# Patient Record
Sex: FEMALE | Race: WHITE | NOT HISPANIC OR LATINO | Employment: OTHER | URBAN - METROPOLITAN AREA
[De-identification: names, ages, dates, MRNs, and addresses within clinical notes are randomized per-mention and may not be internally consistent; named-entity substitution may affect disease eponyms.]

---

## 2018-08-13 ENCOUNTER — OFFICE VISIT CONVERTED (OUTPATIENT)
Dept: SURGERY | Facility: CLINIC | Age: 68
End: 2018-08-13
Attending: SURGERY

## 2020-04-20 ENCOUNTER — TELEPHONE CONVERTED (OUTPATIENT)
Dept: GASTROENTEROLOGY | Facility: CLINIC | Age: 70
End: 2020-04-20
Attending: INTERNAL MEDICINE

## 2021-05-12 NOTE — PROGRESS NOTES
Quick Note      Patient Name: Tosha Jenkins   Patient ID: [id number]   Sex: Female   YOB: 1950    Primary Care Provider: Otoniel Ferreira DO   Referring Provider: Hector Hahn MD    Visit Date: April 20, 2020    Provider: Eva Nagy MD   Location: [facility]   Location Address: [address] [place] [address], [address]  [place], [place]  [id number]   Location Phone: [phone]          History Of Present Illness  TELEHEALTH TELEPHONE VISIT  Chief Complaint: diarrhea   Tosha Jenkins is a 69 year old /White female who is presenting for evaluation via telehealth telephone visit. Verbal consent obtained before beginning visit.   Provider spent 19 minutes with the patient during telehealth visit.   The following staff were present during this visit: Eva Nagy MD; Radha Avilez MA   Past Medical History/Overview of Patient Symptoms     Ms. Jenkins is a 69 year old female with h/o anemia and colon polyps who presents for evaluation of diarrhea. EGD/colonoscopy in 2018 with Dr. Manzano for anemia.  Pt reports most recently, her anemia has resolved.  Had total knee replacement 10/2019 and was started on stool softeners/laxatives.  She was transferred to rehab.  She reports at that time had difficulty controlling her bowels.  She reports she did stool studies that were negative.  Symptoms have improved since February.  She now will have regular bowel movements.  Occ. loose stools.  She has been taking a probiotic a few months now with improvements in her bowel movements.  She will have 1 bowel movement daily.  No melena, hematochezia.  No abdominal pain.  Weight stable, increasing.  She reports abdominal cramping and significant diarrhea after eating large meal like at [facility].    EGD (8/22/2018): Normal esophagus, small hiatal hernia, gastric erythema, normal duodenum.  Antrum biopsy with slight chronic gastritis.      Colonoscopy (8/22/2018): Moderate pandiverticulosis,  "normal terminal ileum, medium nonbleeding internal hemorrhoids.  Report of TI biopsies performed but no pathology available.       Vitals  Date Time BP Position Site L\R Cuff Size HR RR TEMP (F) WT  HT  BMI kg/m2 BSA m2 O2 Sat HC       04/20/2020 09:22 AM         220lbs 0oz 5'  5\" 36.61 2.14               Assessment  · Diarrhea     787.91/R19.7  · Change in bowel habits     787.99/R19.4  · Irritable bowel syndrome     564.1/K58.9    Problems Reconciled  Plan  · Orders  o Physician Telephone Evaluation, 11-20 minutes (92533) - - 04/20/2020  · Medications  o dicyclomine 10 mg oral capsule   SIG: take 1 capsule (10 mg) by oral route 2 times per day as needed for abdominal pain   DISP: (60) capsules with 0 refills  Prescribed on 04/20/2020     · Instructions  o Plan Of Care:   o Discussed continuing probiotic to help regulate bowel movements since seems to be doing well with current probiotic. If symptoms returned, would perform Celiac serologies miguel angel. with previous h/o anemia.  o Discussed trial of Bentyl prn for IBS symptoms associated with eating out. Discussed potential side effects of med. Another option would be taking loperamide before a meal.  · Disposition  o Follow up in 6 months.            Electronically Signed by: Eva Nagy MD -Author on April 20, 2020 09:49:56 AM  "

## 2021-05-15 VITALS — BODY MASS INDEX: 36.65 KG/M2 | WEIGHT: 220 LBS | HEIGHT: 65 IN

## 2021-05-16 VITALS — WEIGHT: 218.25 LBS | BODY MASS INDEX: 36.36 KG/M2 | HEIGHT: 65 IN | RESPIRATION RATE: 16 BRPM

## 2024-04-17 ENCOUNTER — TELEPHONE (OUTPATIENT)
Dept: SURGERY | Facility: CLINIC | Age: 74
End: 2024-04-17
Payer: MEDICARE

## 2024-04-17 NOTE — TELEPHONE ENCOUNTER
PT CX'D HER APPT WITH APRIL FOR COLON CONSULT ON 4/24/24, CALLED PT TO RS, NO ANSWER, LMOM, IF PT CALLS BACK SHE CAN BE RESCHEDULED WITH DAMIAN SANTIAGO NEXT AVAILABLE FOR COLON CONSULT.

## 2025-03-17 NOTE — PROGRESS NOTES
Chief Complaint: Blood in Urine (No burning and pain)    Subjective         History of Present Illness  Tosha Jenkins is a 74 y.o. female presents to [facility] to be seen for gross hematuria.    The patient reports that she first started to notice pink blood-tinged urine back in July 2023.  She reports that at that time she had hematuria for a few days and then it resolved and she really did not see blood again in her urine until December 2024.  She reports that since December 2024 she has had approximately 3 episodes of asymptomatic gross hematuria.  She reports that the last time that she saw blood in her urine was in late February or early March.    She does report that she does urinate frequently and also has issues with urgency and urge incontinence, but these are not overly bothersome to her at this time and she attributes her symptoms to her drinking a lot of water.  She does wear a incontinence pad daily.  She has never been treated for overactive bladder and is not interested in treatment for this at this time.    Urinary symptoms/history:   Frequency-admits, but is drinking a lot of water     Urgency-sometimes, depending on how much she has drank and how full her bladder is    Incontinence-admits, sometimes due to urgency, wears a pad, denies stress incontinence     Nocturia-admits, 2 X per night on average     GH-admits    History of stones-denies      surgeries-denies     Family history of  malignancy-denies     Cardiopulmonary-HTN    Anticoagulants-none     Smoker-denies     Urine microscopy  2/5/2025 0-2 RBCs per hpf      Objective     Past Medical History:   Diagnosis Date    Hiatal hernia     Hypertension     Polio osteopathy of lower leg, unspecified laterality        Past Surgical History:   Procedure Laterality Date    KNEE ARTHROPLASTY Left     TUBAL ABDOMINAL LIGATION           Current Outpatient Medications:     amLODIPine (NORVASC) 2.5 MG tablet, Take 1 tablet  "by mouth Daily., Disp: , Rfl:     D3-1000 25 MCG (1000 UT) capsule, Take 1 capsule by mouth Daily., Disp: , Rfl:     ferrous sulfate 325 (65 FE) MG tablet, Take 1 tablet by mouth Daily With Breakfast., Disp: , Rfl:     losartan (COZAAR) 100 MG tablet, Take 1 tablet by mouth Daily., Disp: , Rfl:     No Known Allergies     History reviewed. No pertinent family history.    Social History     Socioeconomic History    Marital status:        Vital Signs:   Resp 18   Ht 165.1 cm (65\")   Wt 103 kg (226 lb)   BMI 37.61 kg/m²      Physical Exam  Vitals and nursing note reviewed.   Constitutional:       General: She is not in acute distress.     Appearance: Normal appearance. She is not toxic-appearing.   Pulmonary:      Effort: Pulmonary effort is normal. No respiratory distress.   Neurological:      General: No focal deficit present.      Mental Status: She is alert and oriented to person, place, and time.          Result Review :   The following data was reviewed by: MICHELLE Venegas on 03/19/2025:  Results for orders placed or performed in visit on 03/19/25   Bladder Scan    Collection Time: 03/19/25 10:31 AM   Result Value Ref Range    Volume: 0 ML       Bladder Scan interpretation 03/19/2025    Estimation of residual urine via imgfaveI 3000 Verathon Bladder Scan  MA/nurse performing: MADELEINE Wagner  Residual Urine: 0 ml  Indication: Gross hematuria    Urinary frequency    Urinary urgency    Urge incontinence of urine   Position: Supine  Examination: Incremental scanning of the suprapubic area using 2.0 MHz transducer using copious amounts of acoustic gel.   Findings: An anechoic area was demonstrated which represented the bladder, with measurement of residual urine as noted. I inspected this myself. In that the residual urine was stable or insignificant, refer to plan for treatment and plan necessary at this time.           Procedures        Assessment and Plan    Diagnoses and all orders for this visit:    1. Gross " hematuria (Primary)  -     Bladder Scan  -     CT Abdomen Pelvis With & Without Contrast; Future  -     Cystoscopy; Future    2. Urinary frequency    3. Urinary urgency    4. Urge incontinence of urine    Will proceed with gross hematuria workup per AUA guidelines including upper and lower urinary tract evaluations.  Will obtain CT scan with/without/and delayed imaging for upper tract evaluation; patient denies contrast allergy.  Patient to schedule cystoscopy for lower urinary tract evaluation after CT scan.     The patient is not overly bothered by her urinary symptoms of frequency, urgency and urge incontinence at this time.  She does not desire treatment with any medication for the symptoms at this time.  Encouraged behavioral modifications including fluid management, limiting fluids before bedtime, elevating legs before bedtime, avoiding bladder irritants including caffeine, etc.    Will schedule patient for cystoscopy.  I will plan to see the patient back in the office as needed/as recommended by Dr. Benz following the patient's cystoscopy.      Follow Up   Return for Cystoscopy with Dr. Benz on 5/7/2025 at 2pm .  Patient was given instructions and counseling regarding her condition or for health maintenance advice. Please see specific information pulled into the AVS if appropriate.         This document has been electronically signed by MICHELLE Venegas  March 19, 2025 11:18 EDT

## 2025-03-19 ENCOUNTER — OFFICE VISIT (OUTPATIENT)
Dept: UROLOGY | Age: 75
End: 2025-03-19
Payer: MEDICARE

## 2025-03-19 VITALS — RESPIRATION RATE: 18 BRPM | HEIGHT: 65 IN | WEIGHT: 226 LBS | BODY MASS INDEX: 37.65 KG/M2

## 2025-03-19 DIAGNOSIS — N39.41 URGE INCONTINENCE OF URINE: ICD-10-CM

## 2025-03-19 DIAGNOSIS — R39.15 URINARY URGENCY: ICD-10-CM

## 2025-03-19 DIAGNOSIS — R35.0 URINARY FREQUENCY: ICD-10-CM

## 2025-03-19 DIAGNOSIS — R31.0 GROSS HEMATURIA: Primary | ICD-10-CM

## 2025-03-19 LAB — SPECIMEN VOL SMN: NORMAL ML

## 2025-03-19 RX ORDER — AMLODIPINE BESYLATE 2.5 MG/1
1 TABLET ORAL DAILY
COMMUNITY

## 2025-03-19 RX ORDER — UBIDECARENONE 100 MG
1000 CAPSULE ORAL DAILY
COMMUNITY

## 2025-03-19 RX ORDER — FERROUS SULFATE 325(65) MG
325 TABLET ORAL
COMMUNITY

## 2025-03-19 RX ORDER — LOSARTAN POTASSIUM 100 MG/1
100 TABLET ORAL DAILY
COMMUNITY

## 2025-04-22 ENCOUNTER — TELEPHONE (OUTPATIENT)
Dept: UROLOGY | Age: 75
End: 2025-04-22
Payer: MEDICARE

## 2025-04-22 NOTE — TELEPHONE ENCOUNTER
Caller: QIALDO ÁLVAREZIN    Best call back number: [phone] (home)       What is the best time to reach you: ANYTIME    Who are you requesting to speak with (clinical staff, provider,  specific staff member): CLINICAL STAFF      What was the call regarding: PT ASKING IF FASTING IS REQUIRED PRIOR TO CT SCAN. PLEASE CALL PT BACK TO ADVISE.

## 2025-04-23 ENCOUNTER — HOSPITAL ENCOUNTER (OUTPATIENT)
Dept: CT IMAGING | Facility: HOSPITAL | Age: 75
Discharge: HOME | End: 2025-04-23
Admitting: NURSE PRACTITIONER
Payer: MEDICARE

## 2025-04-23 DIAGNOSIS — R31.0 GROSS HEMATURIA: ICD-10-CM

## 2025-04-23 LAB
CREAT BLDA-MCNC: 0.9 MG/DL (ref 0.6–1.3)
EGFRCR SERPLBLD CKD-EPI 2021: 66.8 ML/MIN/1.73

## 2025-04-23 PROCEDURE — 74178 CT ABD&PLV WO CNTR FLWD CNTR: CPT

## 2025-04-23 PROCEDURE — 25510000001 IOPAMIDOL PER 1 ML: Performed by: NURSE PRACTITIONER

## 2025-04-23 PROCEDURE — 82565 ASSAY OF CREATININE: CPT

## 2025-04-23 RX ORDER — IOPAMIDOL 755 MG/ML
100 INJECTION, SOLUTION INTRAVASCULAR
Status: COMPLETED | OUTPATIENT
Start: 2025-04-23 | End: 2025-04-23

## 2025-04-23 RX ADMIN — IOPAMIDOL 100 ML: 755 INJECTION, SOLUTION INTRAVENOUS at 10:25

## 2025-04-24 ENCOUNTER — RESULTS FOLLOW-UP (OUTPATIENT)
Dept: UROLOGY | Age: 75
End: 2025-04-24
Payer: MEDICARE

## 2025-04-24 NOTE — PROGRESS NOTES
Could you please let the patient know that her CT scan did not show any urological concerns or identifiable causes for her gross hematuria.  She does have a simple cyst on the right kidney, but that is not anything concerning.  The patient should go ahead with her cystoscopy in office as scheduled.  If the patient has any questions, please let me know.  Thank you.

## 2025-04-24 NOTE — TELEPHONE ENCOUNTER
Patient was called and advised of the CT scan results and informed to keep the cysto appointment as scheduled.  Patient voiced understanding.

## 2025-05-14 ENCOUNTER — PROCEDURE VISIT (OUTPATIENT)
Dept: UROLOGY | Age: 75
End: 2025-05-14
Payer: MEDICARE

## 2025-05-14 VITALS — WEIGHT: 226 LBS | BODY MASS INDEX: 37.65 KG/M2 | HEIGHT: 65 IN

## 2025-05-14 DIAGNOSIS — R31.0 GROSS HEMATURIA: ICD-10-CM

## 2025-05-14 RX ORDER — ALBUTEROL SULFATE 90 UG/1
AEROSOL, METERED RESPIRATORY (INHALATION)
COMMUNITY
Start: 2025-05-05

## 2025-05-14 RX ORDER — HYDROCHLOROTHIAZIDE 12.5 MG/1
12.5 TABLET ORAL EVERY MORNING
COMMUNITY
Start: 2025-04-23

## 2025-05-14 RX ORDER — DULOXETIN HYDROCHLORIDE 30 MG/1
CAPSULE, DELAYED RELEASE ORAL
COMMUNITY
Start: 2025-05-07

## 2025-05-14 RX ORDER — METOPROLOL SUCCINATE 25 MG/1
TABLET, EXTENDED RELEASE ORAL
COMMUNITY
Start: 2025-05-07

## 2025-05-14 NOTE — PROGRESS NOTES
Cystoscopy    Date/Time: 5/14/2025 11:02 AM    Performed by: Venessa Benz MD  Authorized by: Leslee Marshall APRN  Preparation: Patient was prepped and draped in the usual sterile fashion.  Local anesthesia used: no    Anesthesia:  Local anesthesia used: no    Sedation:  Patient sedated: no    Patient tolerance: patient tolerated the procedure well with no immediate complications        Cytoscopy Procedure:     Procedure: Flexible cytoscope was passed per urethra into the bladder without difficulty after proper consent. The bladder was inspected in a systematic meridian fashion. There were no tumors, lesions, stones, or other abnormalities noted within the bladder. Of note, there was no increased vascularity as well. Both ureteral orifices were identified and were normal in appearance. The flexible cytoscope was removed. The patient tolerated the procedure well.     FOLLOW UP OFFICE NOTE: The CT scan of the Abdomen/Pelvis was without  issues other than a simple renal cyst.